# Patient Record
Sex: MALE | Race: WHITE
[De-identification: names, ages, dates, MRNs, and addresses within clinical notes are randomized per-mention and may not be internally consistent; named-entity substitution may affect disease eponyms.]

---

## 2018-09-03 ENCOUNTER — HOSPITAL ENCOUNTER (EMERGENCY)
Dept: HOSPITAL 14 - H.ER | Age: 33
LOS: 1 days | Discharge: HOME | End: 2018-09-04
Payer: SELF-PAY

## 2018-09-03 VITALS
SYSTOLIC BLOOD PRESSURE: 154 MMHG | TEMPERATURE: 98.2 F | DIASTOLIC BLOOD PRESSURE: 74 MMHG | RESPIRATION RATE: 16 BRPM | OXYGEN SATURATION: 98 % | HEART RATE: 78 BPM

## 2018-09-03 DIAGNOSIS — L23.7: Primary | ICD-10-CM

## 2018-09-03 PROCEDURE — 99282 EMERGENCY DEPT VISIT SF MDM: CPT

## 2018-09-03 PROCEDURE — 96372 THER/PROPH/DIAG INJ SC/IM: CPT

## 2018-09-04 NOTE — ED PDOC
HPI: Skin/Bite Injury


Time Seen by Provider: 09/03/18 23:04


Chief Complaint (Nursing): Abnormal Skin Integrity


History Per: Patient,  (9042 ([place]ux[place]))


Additional Complaint(s): 





Pt. states on Saturday he came in contact with some leaves while at work. The 

following day he developed a pruritic rash on his arms which then has spready 

to his face. Has not used any meds to help relieve symptoms. Denies fever, 

throat swelling, SOB. 





Past Medical History


Reviewed: Historical Data, Nursing Documentation, Vital Signs


Vital Signs: 





 Last Vital Signs











Temp  98.2 F   09/03/18 22:57


 


Pulse  78   09/03/18 22:57


 


Resp  16   09/03/18 22:57


 


BP  154/74 H  09/03/18 22:57


 


Pulse Ox  98   09/03/18 22:57














- Family History


Family History: States: No Known Family Hx





- Home Medications


Home Medications: 


 Ambulatory Orders











 Medication  Instructions  Recorded


 


DiphenhydrAMINE [Benadryl] 50 mg PO Q6 PRN #30 cap 09/03/18


 


predniSONE [predniSONE Tab] 5 mg PO DAILY #67 tab 09/03/18














- Allergies


Allergies/Adverse Reactions: 


 Allergies











Allergy/AdvReac Type Severity Reaction Status Date / Time


 


No Known Allergies Allergy   Verified 09/03/18 23:00














Review of Systems


ROS Statement: Except As Marked, All Systems Reviewed And Found Negative


Skin: Positive for: Rash





Physical Exam





- Physical Exam


Appears: Positive for: Well, Non-toxic, No Acute Distress


Skin: Positive for: Normal Color, Warm, Rash (Vesicles in linear array on b/l 

forearms, R hand, and maculopapular rash throughout entire face and neck)


Eye Exam: Positive for: Normal appearance


ENT: Positive for: Normal ENT Inspection.  Negative for: Pharyngeal Erythema, 

Tonsillar Exudate, Tonsillar Swelling


Cardiovascular/Chest: Positive for: Regular Rate, Rhythm


Respiratory: Positive for: Normal Breath Sounds.  Negative for: Respiratory 

Distress


Neurologic/Psych: Positive for: Alert, Oriented (x3)





- ECG


O2 Sat by Pulse Oximetry: 98





- Progress


ED Course And Treament: 





Benadryl 50mg IM ordered. 


Of note pt. is not driving.





Disposition





- Clinical Impression


Clinical Impression: 


 Poison ivy








- Patient ED Disposition


Is Patient to be Admitted: No





- Disposition


Referrals: 


[facility] [Outside]


Disposition: Routine/Home


Disposition Time: 23:44


Condition: IMPROVED


Additional Instructions: 





HONG YEH, thank you for letting us take care of you today. Your provider 

was Sherry Collins MD and you were treated for POSS RASH. The emergency 

medical care you received today was directed at your acute symptoms. If you 

were prescribed any medication, please fill it and take as directed. It may 

take several days for your symptoms to resolve. Return to the Emergency 

Department if your symptoms worsen, do not improve, or if you have any other 

problems.





Please contact your doctor or call one of the physicians/clinics you have been 

referred to that are listed on the Patient Visit Information form that is 

included in your discharge packet. Bring any paperwork you were given at 

discharge with you along with any medications you are taking to your follow up 

visit. Our treatment cannot replace ongoing medical care by a primary care 

provider outside of the emergency department.





Thank you for allowing the [facility] team to be part of your care today.








If you had an X-Ray or CT scan: A Radiologist will review the ED reading if any 

change in treatment is needed we will contact you.***





If you had a blood, urine, or wound culture: It will take several days for the 

results, if any change in treatment is needed we will contact you.***





If you had an STI test: It will take 48 hours for the results. Please call 

after 1 week if you have not heard back.***


Prescriptions: 


DiphenhydrAMINE [Benadryl] 50 mg PO Q6 PRN #30 cap


 PRN Reason: itching or rash


predniSONE [predniSONE Tab] 5 mg PO DAILY #67 tab


Instructions:  Poison Ivy, Poison Oak, Poison [place] (DC)


Forms:  [employer] ([place])


Print Language: Amharic